# Patient Record
Sex: FEMALE | Race: WHITE
[De-identification: names, ages, dates, MRNs, and addresses within clinical notes are randomized per-mention and may not be internally consistent; named-entity substitution may affect disease eponyms.]

---

## 2017-08-11 ENCOUNTER — HOSPITAL ENCOUNTER (EMERGENCY)
Dept: HOSPITAL 31 - C.ER | Age: 52
LOS: 1 days | Discharge: HOME | End: 2017-08-12
Payer: COMMERCIAL

## 2017-08-11 VITALS — BODY MASS INDEX: 27.1 KG/M2

## 2017-08-11 DIAGNOSIS — N95.2: Primary | ICD-10-CM

## 2017-08-12 VITALS
OXYGEN SATURATION: 99 % | DIASTOLIC BLOOD PRESSURE: 79 MMHG | RESPIRATION RATE: 16 BRPM | TEMPERATURE: 97.9 F | HEART RATE: 59 BPM | SYSTOLIC BLOOD PRESSURE: 155 MMHG

## 2017-08-12 LAB
BILIRUB UR-MCNC: NEGATIVE MG/DL
GLUCOSE UR STRIP-MCNC: NORMAL MG/DL
KETONES UR STRIP-MCNC: NEGATIVE MG/DL
LEUKOCYTE ESTERASE UR-ACNC: (no result) LEU/UL
PH UR STRIP: 6 [PH] (ref 5–8)
PROT UR STRIP-MCNC: NEGATIVE MG/DL
RBC # UR STRIP: (no result) /UL
RBC #/AREA URNS HPF: 4 /HPF (ref 0–3)
SP GR UR STRIP: 1 (ref 1–1.03)
UROBILINOGEN UR-MCNC: NORMAL MG/DL (ref 0.2–1)
WBC #/AREA URNS HPF: 2 /HPF (ref 0–5)

## 2017-08-12 NOTE — C.PDOC
History Of Present Illness


51 year old female with vaginal itching, dryness and irritation for 3 days. She 

also reports burning with urination. Patient tried monistat few days prior 

without relief. Denies any flank or abdominal pain, no bleeding. 


Time Seen by Provider: 17 00:22


Chief Complaint (Nursing): Female Genitourinary


History Per: Patient


History/Exam Limitations: no limitations


Onset/Duration Of Symptoms: Days (3)


Current Symptoms Are (Timing): Still Present


Quality Of Discomfort: Burning


Associated Symptoms: Urinary Symptoms (burning with urination )


Additional History Per: Patient


Abnormal Vaginal Bleeding: No





Past Medical History


Reviewed: Historical Data, Nursing Documentation, Vital Signs


Vital Signs: 


 Last Vital Signs











Temp  97.9 F   17 00:18


 


Pulse  59 L  17 00:18


 


Resp  16   17 00:18


 


BP  155/79 H  17 00:18


 


Pulse Ox  99   17 04:34














- Medical History


PMH: Bronchitis


Surgical History: Cholecystectomy, Endoscopy


Family History: States: Unknown Family Hx





- Social History


Hx Tobacco Use: No


Hx Alcohol Use: No


Hx Substance Use: No





- Immunization History


Hx Tetanus Toxoid Vaccination: No


Hx Influenza Vaccination: No


Hx Pneumococcal Vaccination: No





Review Of Systems


Gastrointestinal: Negative for: Abdominal Pain


Genitourinary: Positive for: Dysuria, Other (+vaginal itching, dryness and 

irritation ).  Negative for: Vaginal Bleeding


Musculoskeletal: Negative for: Back Pain





Physical Exam





- Physical Exam


Appears: Non-toxic, No Acute Distress


Skin: Normal Color, Warm, Dry


Head: Atraumatic, Normacephalic


Eye(s): bilateral: Normal Inspection


Oral Mucosa: Moist


Neck: Normal ROM, Supple


Chest: Symmetrical


Gastrointestinal/Abdominal: Bowel Sounds, Soft, No Tenderness, No Mass, No 

Guarding


Back: No Vertebral Tenderness


Pelvic: No Vaginal Bleeding, No Vaginal Discharge, No Cervical Motion Tenderness

, Other (+dryness and mild atrophy. no malodor )


Extremity: Normal ROM, Capillary Refill (less than 2 seconds )


Neurological/Psych: Oriented x3, Normal Speech


Gait: Steady





ED Course And Treatment


O2 Sat by Pulse Oximetry: 99 (on RA)


Pulse Ox Interpretation: Normal





Medical Decision Making


Medical Decision Makin year old female with vaginal itching,dryness and irritation. UA ordered and 

reviewed which was negative. Exam showed dryness and mild atrophy, no discharge 

or malodor. Will prescribe vaginal moisturizer. Urine culture was ordered. 

Patient was advised to follow up with gynecologist. 





Disposition


Counseled Patient/Family Regarding: Diagnosis, Need For Followup, Rx Given





- Disposition


Referrals: 


[facility] [Outside]


[facility] [Outside]


Disposition: HOME/ ROUTINE


Disposition Time: 00:43


Condition: STABLE


Additional Instructions: 


Por favor, siga con gineclogo o clnica


Use un aplicador de gel vaginal john veces por semana


Prescriptions: 


Glycerin/Min Oil/Polycarbophil [Replens] 1 cre VG HS #1 cre


Instructions:  Vaginitis (ED)


Forms:  [facility] (English)


Print Language: Slovenian





- POA


Present On Arrival: None





- Clinical Impression


Clinical Impression: 


 Atrophic vaginitis








- PA / NP / Resident Statement


MD/DO has reviewed & agrees with the documentation as recorded.





- Scribe Statement


The provider has reviewed the documentation as recorded by the Scribe (Faiza Everett)








All medical record entries made by the Scribe were at my direction and 

personally dictated by me. I have reviewed the chart and agree that the record 

accurately reflects my personal performance of the history, physical exam, 

medical decision making, and the department course for this patient. I have 

also personally directed, reviewed, and agree with the discharge instructions 

and disposition.

## 2018-07-29 ENCOUNTER — HOSPITAL ENCOUNTER (EMERGENCY)
Dept: HOSPITAL 31 - C.ER | Age: 53
LOS: 1 days | Discharge: HOME | End: 2018-07-30
Payer: SELF-PAY

## 2018-07-29 VITALS — RESPIRATION RATE: 20 BRPM

## 2018-07-29 VITALS — BODY MASS INDEX: 27.1 KG/M2

## 2018-07-29 DIAGNOSIS — K57.32: Primary | ICD-10-CM

## 2018-07-29 LAB
ALBUMIN SERPL-MCNC: 3.9 G/DL (ref 3.5–5)
ALBUMIN/GLOB SERPL: 1.3 {RATIO} (ref 1–2.1)
ALT SERPL-CCNC: 34 U/L (ref 9–52)
APTT BLD: 31 SECONDS (ref 21–34)
AST SERPL-CCNC: 18 U/L (ref 14–36)
BASOPHILS # BLD AUTO: 0.1 K/UL (ref 0–0.2)
BASOPHILS NFR BLD: 0.5 % (ref 0–2)
BILIRUB UR-MCNC: NEGATIVE MG/DL
BUN SERPL-MCNC: 12 MG/DL (ref 7–17)
CALCIUM SERPL-MCNC: 9 MG/DL (ref 8.6–10.4)
EOSINOPHIL # BLD AUTO: 0.2 K/UL (ref 0–0.7)
EOSINOPHIL NFR BLD: 1.8 % (ref 0–4)
ERYTHROCYTE [DISTWIDTH] IN BLOOD BY AUTOMATED COUNT: 13.9 % (ref 11.5–14.5)
GFR NON-AFRICAN AMERICAN: > 60
GLUCOSE UR STRIP-MCNC: NORMAL MG/DL
HGB BLD-MCNC: 12.6 G/DL (ref 11–16)
INR PPP: 1
LEUKOCYTE ESTERASE UR-ACNC: (no result) LEU/UL
LIPASE: 64 U/L (ref 23–300)
LYMPHOCYTES # BLD AUTO: 1.6 K/UL (ref 1–4.3)
LYMPHOCYTES NFR BLD AUTO: 16.9 % (ref 20–40)
MCH RBC QN AUTO: 27.4 PG (ref 27–31)
MCHC RBC AUTO-ENTMCNC: 33.1 G/DL (ref 33–37)
MCV RBC AUTO: 82.7 FL (ref 81–99)
MONOCYTES # BLD: 0.9 K/UL (ref 0–0.8)
MONOCYTES NFR BLD: 8.8 % (ref 0–10)
NEUTROPHILS # BLD: 7 K/UL (ref 1.8–7)
NEUTROPHILS NFR BLD AUTO: 72 % (ref 50–75)
NRBC BLD AUTO-RTO: 0 % (ref 0–2)
PH UR STRIP: 7 [PH] (ref 5–8)
PLATELET # BLD: 126 K/UL (ref 130–400)
PMV BLD AUTO: 12.3 FL (ref 7.2–11.7)
PROT UR STRIP-MCNC: NEGATIVE MG/DL
PROTHROMBIN TIME: 11 SECONDS (ref 9.7–12.2)
RBC # BLD AUTO: 4.61 MIL/UL (ref 3.8–5.2)
RBC # UR STRIP: NEGATIVE /UL
SP GR UR STRIP: 1.01 (ref 1–1.03)
SQUAMOUS EPITHIAL: 10 /HPF (ref 0–5)
UROBILINOGEN UR-MCNC: NORMAL MG/DL (ref 0.2–1)
WBC # BLD AUTO: 9.7 K/UL (ref 4.8–10.8)

## 2018-07-29 PROCEDURE — 99284 EMERGENCY DEPT VISIT MOD MDM: CPT

## 2018-07-29 PROCEDURE — 74177 CT ABD & PELVIS W/CONTRAST: CPT

## 2018-07-29 PROCEDURE — 80053 COMPREHEN METABOLIC PANEL: CPT

## 2018-07-29 PROCEDURE — 85730 THROMBOPLASTIN TIME PARTIAL: CPT

## 2018-07-29 PROCEDURE — 83690 ASSAY OF LIPASE: CPT

## 2018-07-29 PROCEDURE — 81001 URINALYSIS AUTO W/SCOPE: CPT

## 2018-07-29 PROCEDURE — 85025 COMPLETE CBC W/AUTO DIFF WBC: CPT

## 2018-07-29 PROCEDURE — 85610 PROTHROMBIN TIME: CPT

## 2018-07-29 PROCEDURE — 84703 CHORIONIC GONADOTROPIN ASSAY: CPT

## 2018-07-29 NOTE — C.PDOC
Time Seen by Provider: 07/29/18 20:43


Chief Complaint (Nursing): Abdominal Pain


History Per: Patient, 


Onset/Duration Of Symptoms: Days (2)


Current Symptoms Are (Timing): Still Present


Severity: Moderate


Location Of Pain/Discomfort: LLQ


Quality Of Discomfort: "Pain"


Associated Symptoms: Diarrhea, Back Pain


Alleviating Factors: None


Additional History Per: Prior Records





Past Medical History


Reviewed: Historical Data, Nursing Documentation, Vital Signs


Vital Signs: 


 Last Vital Signs











Temp  98.7 F   07/29/18 20:34


 


Pulse  62   07/29/18 20:34


 


Resp  20   07/29/18 20:34


 


BP  153/83 H  07/29/18 20:34


 


Pulse Ox  98   07/29/18 23:48














- Medical History


PMH: Bronchitis


Surgical History: Cholecystectomy, Endoscopy


Family History: States: Unknown Family Hx





- Social History


Hx Tobacco Use: No


Hx Alcohol Use: No


Hx Substance Use: No





- Immunization History


Hx Tetanus Toxoid Vaccination: No


Hx Influenza Vaccination: No


Hx Pneumococcal Vaccination: No





Review Of Systems


Except As Marked, All Systems Reviewed And Found Negative.


Constitutional: Negative for: Fever, Weakness


Cardiovascular: Negative for: Chest Pain


Respiratory: Negative for: Shortness of Breath


Gastrointestinal: Positive for: Abdominal Pain, Diarrhea.  Negative for: 

Vomiting


Genitourinary: Negative for: Dysuria


Musculoskeletal: Negative for: Neck Pain


Skin: Negative for: Rash


Neurological: Negative for: Weakness, Numbness





Physical Exam





- Physical Exam


Appears: Non-toxic, No Acute Distress


Skin: Normal Color, Warm, Dry, No Rash


Head: Atraumatic, Normacephalic


Eye(s): bilateral: Normal Inspection, PERRL, EOMI


Neck: Normal ROM, Supple


Cardiovascular: Rhythm Regular


Respiratory: Normal Breath Sounds, No Accessory Muscle Use


Gastrointestinal/Abdominal: Soft, Tenderness (LLQ)


Extremity: Normal ROM


Neurological/Psych: Oriented x3, Normal Motor, Normal Sensation





ED Course And Treatment





- Laboratory Results


Result Diagrams: 


 07/29/18 21:17





 07/29/18 21:17


Lab Interpretation: No Acute Changes


Urine Pregnancy POC: Negative


O2 Sat by Pulse Oximetry: 98


Pulse Ox Interpretation: Normal


Progress Note: I offered pt admission to the hospital, but pt wants to go home 

and try a course of oral antibiotics instead.





Disposition


Counseled Patient/Family Regarding: Studies Performed, Diagnosis, Need For 

Followup, Rx Given





- Disposition


Referrals: 


 at [facility] [Outside]


Disposition: HOME/ ROUTINE


Disposition Time: 00:14


Condition: STABLE


Additional Instructions: 


Drink plenty of fluids. Follow up in the clinic within 1 week for further 

evaluation and treatment. Return to the ER if you develop fever, vomiting, 

worsening of symptoms or if you have any other concerns. 


Prescriptions: 


Ciprofloxacin [Cipro] 1 tab PO BID #14 tab


Metronidazole [Flagyl] 500 mg PO TID #21 tablet


Instructions:  Diverticulitis (DC)


Print Language: Pashto





- Clinical Impression


Clinical Impression: 


 Sigmoid diverticulitis

## 2018-07-30 VITALS
TEMPERATURE: 98 F | HEART RATE: 78 BPM | SYSTOLIC BLOOD PRESSURE: 148 MMHG | DIASTOLIC BLOOD PRESSURE: 68 MMHG | OXYGEN SATURATION: 99 %

## 2018-07-30 NOTE — CT
Date of service: 



07/29/2018



PROCEDURE:  CT Abdomen and Pelvis with contrast



HISTORY:

LLQ pain. Diarrhea. r/o diverticulitis.



COMPARISON:

Comparison is made to the previous study dated 10/25/2016



TECHNIQUE:

Contrast dose: 100 mL Visipaque 320.



Axial and reformatted coronal and sagittal CT images of the abdomen 

and pelvis were obtained after IV and oral contrast administration.



Radiation dose:



Total exam DLP = 492.68 mGy-cm.



This CT exam was performed using one or more of the following dose 

reduction techniques: Automated exposure control, adjustment of the 

mA and/or kV according to patient size, and/or use of iterative 

reconstruction technique.



FINDINGS:



LOWER THORAX:

Unremarkable. 



LIVER:

Unremarkable. No gross lesion or ductal dilatation. 



GALLBLADDER AND BILE DUCTS:

The gallbladder is not visualized likely due to prior 

cholecystectomy. 



PANCREAS:

Unremarkable. No gross lesion or ductal dilatation.



SPLEEN:

Unremarkable. 



ADRENALS:

Again noted is 1.6 centimeter low-attenuation nodule at the left 

adrenal gland likely represent benign adenoma. The right adrenal 

gland is grossly unremarkable. 



KIDNEYS AND URETERS:

Unremarkable. No hydronephrosis. No solid mass. 



VASCULATURE:

Unremarkable. No aortic aneurysm. 



BOWEL:

There are descending and sigmoid colon diverticulosis again noted.  

There are inflammatory changes surrounding the sigmoid colon 

consistent with acute diverticulitis. No evidence of abscess 

formation. No evidence all obstruction.



APPENDIX:

No evidence of appendicitis. 



PERITONEUM:

Unremarkable. No free fluid. No free air. 



LYMPH NODES:

Unremarkable. No enlarged lymph nodes. 



BLADDER:

Unremarkable. 



REPRODUCTIVE:

The uterus is heterogeneous and enlarged likely contains fibroids. 

There is also mildly enlarged left adnexa may contains small cyst. 



BONES:

No acute fracture. 



OTHER FINDINGS:

None.



IMPRESSION:

Findings consistent with sigmoid diverticulitis.  No evidence of 

abscess formation. Diffuse wall thickening of the proximal and mid 

sigmoid colon. Further assessment of the large bowel after the acute 

phase is suggested to exclude neoplasm or other pathology.



Re- demonstration of 1.6 centimeter low-attenuation nodule at the 

left adrenal gland likely represent benign adenoma.



Additional findings as described above.



Preliminary report was submitted by [facility].

## 2019-02-19 ENCOUNTER — HOSPITAL ENCOUNTER (EMERGENCY)
Dept: HOSPITAL 31 - C.ER | Age: 54
LOS: 1 days | Discharge: HOME | End: 2019-02-20
Payer: COMMERCIAL

## 2019-02-19 VITALS — BODY MASS INDEX: 27.1 KG/M2

## 2019-02-19 DIAGNOSIS — D25.9: ICD-10-CM

## 2019-02-19 DIAGNOSIS — K57.32: Primary | ICD-10-CM

## 2019-02-19 DIAGNOSIS — R10.32: ICD-10-CM

## 2019-02-19 LAB
ALBUMIN SERPL-MCNC: 4.5 G/DL (ref 3.5–5)
ALBUMIN/GLOB SERPL: 1.7 {RATIO} (ref 1–2.1)
ALT SERPL-CCNC: 32 U/L (ref 9–52)
AST SERPL-CCNC: 31 U/L (ref 14–36)
BASOPHILS # BLD AUTO: 0.1 K/UL (ref 0–0.2)
BASOPHILS NFR BLD: 0.7 % (ref 0–2)
BILIRUB UR-MCNC: NEGATIVE MG/DL
BUN SERPL-MCNC: 9 MG/DL (ref 7–17)
CALCIUM SERPL-MCNC: 9.1 MG/DL (ref 8.6–10.4)
EOSINOPHIL # BLD AUTO: 0.2 K/UL (ref 0–0.7)
EOSINOPHIL NFR BLD: 2.4 % (ref 0–4)
ERYTHROCYTE [DISTWIDTH] IN BLOOD BY AUTOMATED COUNT: 14.5 % (ref 11.5–14.5)
GFR NON-AFRICAN AMERICAN: > 60
GLUCOSE UR STRIP-MCNC: NORMAL MG/DL
HGB BLD-MCNC: 13.1 G/DL (ref 11–16)
LEUKOCYTE ESTERASE UR-ACNC: (no result) LEU/UL
LIPASE: 63 U/L (ref 23–300)
LYMPHOCYTES # BLD AUTO: 2.4 K/UL (ref 1–4.3)
LYMPHOCYTES NFR BLD AUTO: 30.1 % (ref 20–40)
MCH RBC QN AUTO: 26.6 PG (ref 27–31)
MCHC RBC AUTO-ENTMCNC: 32.2 G/DL (ref 33–37)
MCV RBC AUTO: 82.4 FL (ref 81–99)
MONOCYTES # BLD: 0.6 K/UL (ref 0–0.8)
MONOCYTES NFR BLD: 7.4 % (ref 0–10)
NEUTROPHILS # BLD: 4.7 K/UL (ref 1.8–7)
NEUTROPHILS NFR BLD AUTO: 59.4 % (ref 50–75)
NRBC BLD AUTO-RTO: 0 % (ref 0–2)
PH UR STRIP: 6 [PH] (ref 5–8)
PLATELET # BLD: 116 K/UL (ref 130–400)
PMV BLD AUTO: 11.2 FL (ref 7.2–11.7)
PROT UR STRIP-MCNC: NEGATIVE MG/DL
RBC # BLD AUTO: 4.92 MIL/UL (ref 3.8–5.2)
RBC # UR STRIP: (no result) /UL
SP GR UR STRIP: 1 (ref 1–1.03)
SQUAMOUS EPITHIAL: 13 /HPF (ref 0–5)
UROBILINOGEN UR-MCNC: NORMAL MG/DL (ref 0.2–1)
WBC # BLD AUTO: 8 K/UL (ref 4.8–10.8)

## 2019-02-19 PROCEDURE — 80053 COMPREHEN METABOLIC PANEL: CPT

## 2019-02-19 PROCEDURE — 81001 URINALYSIS AUTO W/SCOPE: CPT

## 2019-02-19 PROCEDURE — 76830 TRANSVAGINAL US NON-OB: CPT

## 2019-02-19 PROCEDURE — 76856 US EXAM PELVIC COMPLETE: CPT

## 2019-02-19 PROCEDURE — 85025 COMPLETE CBC W/AUTO DIFF WBC: CPT

## 2019-02-19 PROCEDURE — 84703 CHORIONIC GONADOTROPIN ASSAY: CPT

## 2019-02-19 PROCEDURE — 99284 EMERGENCY DEPT VISIT MOD MDM: CPT

## 2019-02-19 PROCEDURE — 96374 THER/PROPH/DIAG INJ IV PUSH: CPT

## 2019-02-19 PROCEDURE — 83690 ASSAY OF LIPASE: CPT

## 2019-02-19 PROCEDURE — 96361 HYDRATE IV INFUSION ADD-ON: CPT

## 2019-02-19 PROCEDURE — 74177 CT ABD & PELVIS W/CONTRAST: CPT

## 2019-02-20 VITALS
OXYGEN SATURATION: 98 % | DIASTOLIC BLOOD PRESSURE: 82 MMHG | RESPIRATION RATE: 20 BRPM | HEART RATE: 82 BPM | TEMPERATURE: 98 F | SYSTOLIC BLOOD PRESSURE: 120 MMHG

## 2019-02-20 NOTE — US
Date of service: 



02/19/2019



HISTORY:

SUPRAPUBIC AND PELVIC PAIN.  LMP: 6 months ago 



COMPARISON:

CT abdomen pelvis 07/29/2018.  Pelvic ultrasound 03/31/2014



TECHNIQUE:

Grayscale and color Doppler sonographic images were obtained of the 

pelvis utilizing transabdominal and transvaginal approach.



FINDINGS:



UTERUS:

Anteverted and enlarged in sizemeasuring 10.9 x 7.5 x 8.8 cm.  

Heterogeneous mass noted in the left uterine body measuring 3.6 cm, 

likely fibroid.  Additional heterogeneous mass noted in the posterior 

uterine body measuring 3.3 cm, also likely fibroid. 



ENDOMETRIUM:

endometrial stripe measures 0.9 cm.



CERVIX:

Nabothian cysts noted.



RIGHT OVARY:

Measures 2.4 x 2.2 x 2.5 cm. Dominant follicle noted measuring 1.7 

cm.  Normal flow. 



LEFT OVARY:

Measures 2.4 x 1.8 x 2.1 cm. Sonographically unremarkable Normal 

flow. 



FREE FLUID:

No significant free fluid noted.



OTHER FINDINGS:

None. 



IMPRESSION:

Enlarged uterus.  Uterine masses as above, likely fibroids.



Endometrial stripe measures 0.9 cm.  Correlate with patient's 

menstrual status.  If patient is postmenopausal, then this is 

thickened and correlation should be made with the patient's hormone 

status. 



Additional findings as above. 



Preliminary impression was provided by the [facility] service.  

Findings are concordant.

## 2019-02-20 NOTE — CT
CT abdomen and pelvis 



HISTORY:

Left lower quadrant abdominal pain. 



COMPARISON:

CT scan dated 07/29/2018 



TECHNIQUE:

Multiple contiguous axial images were performed through the abdomen 

and pelvis with the use of intravenous contrast.  Subsequently, 

sagittal and coronal reformatted images were obtained. 



This CT exam was performed using one or more of the following dose 

reduction techniques: Automated exposure control, adjustment of the 

mA and/or kV according to patient size, and/or use of iterative 

reconstruction technique.



Findings: 



Scattered atelectasis at the lung bases. 



No pleural or pericardial effusion. 



Punctate 2 millimeter calcification within the liver. 



Contracted and or resected gallbladder.  Prominent common bile duct. 



Spleen is preserved. 



1.6 centimeter low-attenuation lesion within the left adrenal gland 

demonstrating a Hounsfield unit attenuation of 42, indeterminate.  

Correlation with multiphasic CT or MR would be helpful for further 

evaluation of this lesion/nodule. 



Pancreas is preserved. 



Upper abdominal bowel is preserved. 



Right kidney: No calculi or hydronephrosis. 



Left Kidney: No calculi or hydronephrosis. 



Urinary bladder is preserved. 



Large and heterogeneous uterus with a suggestion of a multi fibroid 

appearance.  In addition, there are focal areas of low attenuation 

within the uterus.  There is a suggestive suggestion of possible 

fibroid extension into the endometrial canal.  Correlation with 

pelvic ultrasound would be helpful for further evaluation if 

clinically indicated. 



Fecal retention in the colon.  Colonic diverticulosis most prominent 

within the left hemicolon and sigmoid colon.  No gross evidence of 

diverticulitis. 



Appendix is visualized and within normal limits. 



Shotty para-aortic and inguinal lymph nodes. 



Shotty mesenteric lymph nodes. 



Degenerative changes in the spine. 



Sclerosis of the bilateral SI joints and pubic symphysis. 



Impression: 



1. Colonic diverticulosis.  No evidence of gross diverticulitis.  

Moderate fecal retention in the colon. 



2. Enlarged heterogeneous probable multi fibroid uterus with focal 

areas of low attenuation within the uterus.  Correlation with pelvic 

ultrasound would be helpful.  Clinical correlation. 



3. 1.6 centimeter indeterminate left adrenal nodule/lesion.  

Correlation with multiphasic contrast enhanced CT or MR is 

recommended for further evaluation if clinically indicated. 



4. Additional findings as above. 



A preliminary report was generated at 8:14 p.m. on 02/19/2018 by Dr. Rene Short from [facility]. 



This case was placed in the PA review folder.

## 2022-08-16 NOTE — C.PDOC
History Of Present Illness


54 y/o female presents to the ED complaining of lower abdominal pain for the 

last 3 days. Pain is localized to the suprapubic area and LLQ. Associated with 

mild dysuria and a few episodes of diarrhea. Patient has a PMHx of diverticul

itis, and states her current pain feels different. Otherwise she denies any 

fevers, chills, nausea, vomiting, flank pain, or hematuria.





Prior surgical hx: Cholecystectomy, Ectopic pregnancy





Time Seen by Provider: 19 17:46


Chief Complaint (Nursing): Abdominal Pain


History Per: Patient


History/Exam Limitations: no limitations


Onset/Duration Of Symptoms: Days (3)


Current Symptoms Are (Timing): Still Present


Severity: Moderate


Location Of Pain/Discomfort: LLQ, Suprapubic


Radiation Of Pain To:: None


Associated Symptoms: Diarrhea, Urinary Symptoms





Past Medical History


Reviewed: Historical Data, Nursing Documentation, Vital Signs


Vital Signs: 





                                Last Vital Signs











Temp  98.1 F   19 16:53


 


Pulse  56 L  19 16:53


 


Resp  17   19 16:53


 


BP  154/82 H  19 16:53


 


Pulse Ox  100   19 16:53














- Medical History


PMH: Bronchitis, Diverticulitis


   Denies: Chronic Kidney Disease


Surgical History: Cholecystectomy, Endoscopy


Other Surgeries: Ectopic pregnancy surgery


Family History: States: Unknown Family Hx





- Social History


Hx Tobacco Use: No


Hx Alcohol Use: No


Hx Substance Use: No





- Immunization History


Hx Tetanus Toxoid Vaccination: No


Hx Influenza Vaccination: No


Hx Pneumococcal Vaccination: No





Review Of Systems


Constitutional: Negative for: Fever, Chills


Cardiovascular: Negative for: Chest Pain


Respiratory: Negative for: Shortness of Breath


Gastrointestinal: Positive for: Abdominal Pain (lower), Diarrhea.  Negative for:

Nausea, Vomiting, Hematochezia


Genitourinary: Positive for: Dysuria (mild).  Negative for: Frequency, 

Incontinence, Hematuria, Vaginal Discharge


Musculoskeletal: Negative for: Back Pain


Neurological: Negative for: Weakness, Dizziness





Physical Exam





- Physical Exam


Appears: Non-toxic, In Acute Distress (mild painful distress)


Skin: Warm, Dry, No Rash


Head: Atraumatic, Normacephalic


Eye(s): bilateral: Normal Inspection, PERRL, EOMI


Neck: Normal ROM


Chest: Symmetrical


Cardiovascular: Rhythm Regular, No Murmur


Respiratory: Normal Breath Sounds, No Accessory Muscle Use, Other (Speaking in 

full sentences)


Gastrointestinal/Abdominal: Soft, Tenderness (Mild suprapubic and LLQ 

tenderness), No Guarding, No Rebound


Back: No CVA Tenderness, No Vertebral Tenderness


Extremity: Bilateral: Atraumatic, Normal Color And Temperature


Pulses: Left Dorsalis Pedis: Normal, Right Dorsalis Pedis: Normal


Neurological/Psych: Oriented x3, Normal Cranial Nerves


Gait: Steady





ED Course And Treatment





- Laboratory Results


Result Diagrams: 


                                 19 18:07





                                 19 18:07


Lab Results: 





                                        











Total Bilirubin  0.4 mg/dL (0.2-1.3)   19  18:07    


 


AST  31 U/L (14-36)   19  18:07    


 


ALT  32 U/L (9-52)   19  18:07    


 


Alkaline Phosphatase  78 U/L ()   19  18:07    


 


Total Protein  7.1 g/dL (6.3-8.3)   19  18:07    


 


Albumin  4.5 g/dL (3.5-5.0)   19  18:07    


 


Globulin  2.6 gm/dL (2.2-3.9)   19  18:07    


 


Albumin/Globulin Ratio  1.7  (1.0-2.1)   19  18:07    








                                        











Lipase  63 U/L ()   19  18:07    








                                        











Urine Color  Straw  (YELLOW)   19  18:07    


 


Urine Clarity  Hazy  (Clear)   19  18:07    


 


Urine pH  6.0  (5.0-8.0)   19  18:07    


 


Ur Specific Gravity  1.003  (1.003-1.030)   19  18:07    


 


Urine Protein  Negative mg/dL (NEGATIVE)   19  18:07    


 


Urine Glucose (UA)  Normal mg/dL (Normal)   19  18:07    


 


Urine Ketones  Negative mg/dL (NEGATIVE)   19  18:07    


 


Urine Blood  1+  (NEGATIVE)  H  19  18:07    


 


Urine Nitrate  Negative  (NEGATIVE)   19  18:07    


 


Urine Bilirubin  Negative  (NEGATIVE)   19  18:    


 


Urine Urobilinogen  Normal mg/dL (0.2-1.0)   19  18:07    


 


Ur Leukocyte Esterase  Neg Jake/uL (Negative)   19  18:07    


 


Urine WBC (Auto)  1 /hpf (0-5)   19  18:07    


 


Urine RBC (Auto)  1 /hpf (0-3)   19  18:07    


 


Ur Squamous Epith Cells  13 /hpf (0-5)  H  19  18:07    











O2 Sat by Pulse Oximetry: 100 (RA)


Pulse Ox Interpretation: Normal





- CT Scan/US


  ** CT Abdomen/Pelvis


Other Rad Studies (CT/US): Interpreted By Me, Read By Radiologist


CT/US Interpretation: Name:CHRISSY NUR Exam Date:2019 7:47:55 PM

EST.  MRN:[id number] Modality Type:CT.  Accession#[id number] Description:CT -

ABDOMEN AND PELVIS WITH CORONAL AND SAGITTAL MPRS.  Gender:F Laterality:Not 

applicable.  :63 Referring Physician:SÁNCHEZ GARCIA MD.  EXAM:  CT 

Abdomen and Pelvis with IV contrast.  CLINICAL HISTORY:  LLQ PAIN.  TECHNIQUE:  

Axial computed tomography images of the abdomen and pelvis with intravenous 

contrast.  0.00 mGy-cm.  CONTRAST:  With; VISI 320 100MLS.  COMPARISON:  None 

provided.  FINDINGS:  LUNG BASES:  The lung bases appear clear. No pleural 

effusions are seen.  LIVER:  Unremarkable.  GALLBLADDER AND BILE DUCTS:  The 

gallbladder appears within normal limits. No radioopaque gallstones are seen. No

biliary ductal dilatation is evident.  PANCREAS:  Unremarkable.  SPLEEN:  Unrem

arkable.  ADRENAL GLANDS:  Unremarkable.  KIDNEYS, URETERS, AND BLADDER:  The 

kidneys appear within normal limits. There is no hydronephrosis or hydroureter. 

No urinary calculi are seen.  Uterus appears enlarged and heterogeneous in 

attenuation measuring approximately 13 x 7 cm x 9.7 cm.  STOMACH AND BOWEL:  

Unremarkable appearance of the stomach and bowel. No evidence of bowel 

obstruction. No evidence suggesting enteritis or colitis.  There is mild 

diverticular changes involving the distal descending colon and sigmoid colon.  

There is no diverticular abscess or mass.  APPENDIX:  No evidence of acute 

appendicitis on CT examination.  PERITONEUM:  No free fluid. No free air.  LYMPH

NODES:  No lymphadenopathy is evident.  REPRODUCTIVE:  Unremarkable as 

visualized.  VASCULATURE:  No evidence of abdominal aortic aneurysm.  BONES:  No

aggressive appearing osseous lesion. No acute osseous pathology evident.  

IMPRESSION:  Enlarged heterogeneous uterus.  Diverticular changes involving the 

sigmoid and distal descending colon.  Correlation with ultrasound examination of

the pelvis as well as clinical correlation is advised.  .  Electronically signed

on 2019 8:14:54 PM EST by:  Rene Flores M.D., Certified by [facility], 

Diagnostic Radiology.  


Progress Note: Blood work, UA ordered and reviewed. Administered 1 bolus NS IVF 

and 2 mg IV Morphine. Ordered CT Abdomen/Pelvis with IV contrast.  CT scan shows

"diverticular changes" in descending and sigmoid colon - suspect diverticulitis.





Disposition


Counseled Patient/Family Regarding: Studies Performed, Diagnosis, Need For 

Followup, Rx Given





- Disposition


Referrals: 


[facility] [Outside]


Disposition: HOME/ ROUTINE


Disposition Time: 00:20


Condition: STABLE


Additional Instructions: 


FOLLOW UP WITH YOUR DOCTOR IN 1-2 DAYS





DRINK PLENTY OF CLEAR FLUIDS





USE MEDICATIONS AS DIRECTED





RETURN TO EMERGENCY ROOM IF YOUR SYMPTOMS BECOME WORSE








SEGUIR CON MEEK MDICO EN 1-2 RODRIGUEZ





BEBER MUCHOS FLUIDOS LIA





UTILICE MEDICAMENTOS SHINE SE DIRIGE





VUELVA A LA JOSE DE EMERGENCIA SI NEW SNTOMAS SE HACEN PEOR





Prescriptions: 


Ciprofloxacin [Cipro] 1 tab PO BID #14 tab


metroNIDAZOLE [Flagyl] 500 mg PO TID #21 tab


Naproxen 375 mg PO BID PRN #20 tablet


 PRN Reason: pain


Instructions:  Acute Abdomen (Belly Pain), Adult (DC)


Forms:  [facility] (English)


Print Language: Mauritanian





- Clinical Impression


Clinical Impression: 


 Abdominal pain, Diverticulitis, Uterine fibroid








- Scribe Statement


The provider has reviewed the documentation as recorded by the Sergio Gomes





Provider Attestation: 


All medical record entries made by the Olga Lidiaibporter were at my direction and 

personally dictated by me. I have reviewed the chart and agree that the record 

accurately reflects my personal performance of the history, physical exam, medi

dhiraj decision making, and the department course for this patient. I have also 

personally directed, reviewed, and agree with the discharge instructions and 

disposition. Presented to ED c/o decreased appetite, intermittent abdominal pain and high BP reading at home since Saturday.